# Patient Record
Sex: MALE | Employment: OTHER | URBAN - METROPOLITAN AREA
[De-identification: names, ages, dates, MRNs, and addresses within clinical notes are randomized per-mention and may not be internally consistent; named-entity substitution may affect disease eponyms.]

---

## 2023-05-31 ENCOUNTER — OFFICE VISIT (OUTPATIENT)
Age: 38
End: 2023-05-31
Payer: OTHER GOVERNMENT

## 2023-05-31 VITALS
OXYGEN SATURATION: 97 % | RESPIRATION RATE: 16 BRPM | SYSTOLIC BLOOD PRESSURE: 160 MMHG | BODY MASS INDEX: 35.88 KG/M2 | DIASTOLIC BLOOD PRESSURE: 101 MMHG | WEIGHT: 250.6 LBS | TEMPERATURE: 97.2 F | HEIGHT: 70 IN | HEART RATE: 86 BPM

## 2023-05-31 DIAGNOSIS — F32.A DEPRESSION, UNSPECIFIED DEPRESSION TYPE: ICD-10-CM

## 2023-05-31 DIAGNOSIS — G25.81 RESTLESS LEGS SYNDROME: Primary | ICD-10-CM

## 2023-05-31 DIAGNOSIS — F17.219 CIGARETTE NICOTINE DEPENDENCE WITH NICOTINE-INDUCED DISORDER: ICD-10-CM

## 2023-05-31 DIAGNOSIS — F15.10 CAFFEINE ABUSE (HCC): ICD-10-CM

## 2023-05-31 DIAGNOSIS — R03.0 ELEVATED BLOOD PRESSURE READING: ICD-10-CM

## 2023-05-31 PROCEDURE — 99204 OFFICE O/P NEW MOD 45 MIN: CPT | Performed by: INTERNAL MEDICINE

## 2023-05-31 RX ORDER — ROPINIROLE 1 MG/1
1 TABLET, FILM COATED ORAL DAILY
Qty: 90 TABLET | Refills: 3 | Status: SHIPPED | OUTPATIENT
Start: 2023-05-31

## 2023-05-31 RX ORDER — GABAPENTIN 300 MG/1
600 CAPSULE ORAL
Qty: 90 CAPSULE | Refills: 3 | Status: SHIPPED | OUTPATIENT
Start: 2023-05-31 | End: 2023-08-31

## 2023-05-31 RX ORDER — FLUOXETINE HYDROCHLORIDE 20 MG/1
20 CAPSULE ORAL DAILY
COMMUNITY

## 2023-05-31 ASSESSMENT — SLEEP AND FATIGUE QUESTIONNAIRES
HOW LIKELY ARE YOU TO NOD OFF OR FALL ASLEEP WHILE SITTING QUIETLY AFTER LUNCH WITHOUT ALCOHOL: 0
HOW LIKELY ARE YOU TO NOD OFF OR FALL ASLEEP WHILE SITTING INACTIVE IN A PUBLIC PLACE: 0
ESS TOTAL SCORE: 4
HOW LIKELY ARE YOU TO NOD OFF OR FALL ASLEEP WHEN YOU ARE A PASSENGER IN A CAR FOR AN HOUR WITHOUT A BREAK: 0
HOW LIKELY ARE YOU TO NOD OFF OR FALL ASLEEP WHILE SITTING AND TALKING TO SOMEONE: 0
HOW LIKELY ARE YOU TO NOD OFF OR FALL ASLEEP WHILE LYING DOWN TO REST IN THE AFTERNOON WHEN CIRCUMSTANCES PERMIT: 2
HOW LIKELY ARE YOU TO NOD OFF OR FALL ASLEEP WHILE WATCHING TV: 1
HOW LIKELY ARE YOU TO NOD OFF OR FALL ASLEEP IN A CAR, WHILE STOPPED FOR A FEW MINUTES IN TRAFFIC: 0
HOW LIKELY ARE YOU TO NOD OFF OR FALL ASLEEP WHILE SITTING AND READING: 1
NECK CIRCUMFERENCE (INCHES): 18.5

## 2023-05-31 ASSESSMENT — PATIENT HEALTH QUESTIONNAIRE - PHQ9
SUM OF ALL RESPONSES TO PHQ QUESTIONS 1-9: 0
2. FEELING DOWN, DEPRESSED OR HOPELESS: 0
SUM OF ALL RESPONSES TO PHQ QUESTIONS 1-9: 0
SUM OF ALL RESPONSES TO PHQ9 QUESTIONS 1 & 2: 0
1. LITTLE INTEREST OR PLEASURE IN DOING THINGS: 0

## 2023-05-31 NOTE — PATIENT INSTRUCTIONS
Please try taking Requip at around 1800 and gabapentin about  30 minutes prior to going to sleep      Please call our clinic back at [phone] or send a message on "[employer] [place]" if you have any questions or concerns or if you are experiencing any of the following: You have not received a follow up appointment within 30 days prior the recommended follow up time. If you are not tolerating treatment plan and/or not able to obtain equipment or prescribed medication(s). if you are experiencing any difficulties with the [employer]  (DME) Company you may be using or is assigned to you. Two weeks have passed and you have not received an appointment for a scheduled procedure. Two weeks have passed since you underwent a test and/or procedure and you have not received your results. Patients on Inhaler Therapy: If you are having difficulty and/or are not able to obtain refills of your inhalers- Please contact our office as soon as possible  Please read directions carefully and use inhalers as directed  If you are not sure how to properly use your inhaler please call our office or ask your pharmacist.  Instructional video can also be found on-line as well    If you are using a CPAP/BIPAP, or Home Ventilator Device- Please note the following. Currently, many DMEs are experiencing supply chain difficulties and orders for equipment may be back logged several weeks to months. Your  Durable Medical Equipment (DME ) company is supposed to provide you with replacement filters, tubing and masks. You can either call your DME when you need new supplies or you can arrange for an automatic shipment schedule. Your need to be seen by our office at lat minimum of every 12 months in order to renew the prescription for these supplies. Please make note of who your DME company is and their phone number. Please make sure that you clean your mask and hosing on a regular basis.   Your DME can provide you with
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
## 2023-05-31 NOTE — PROGRESS NOTES
Slava Jaimes presents today for   Chief Complaint   Patient presents with    Other     Restless Legs Syndrome        Is someone accompanying this pt? No    Is the patient using any DME equipment during OV? No    -DME Company NA    Depression Screening:    PHQ-9 Questionaire 5/31/2023   Little interest or pleasure in doing things 0   Feeling down, depressed, or hopeless 0   PHQ-9 Total Score 0       Learning Needs Questionnaire:     No question data found. Fall Risk:     No flowsheet data found. Abuse Screening:     No flowsheet data found. Coordination of Care:    1. Have you been to the ER, urgent care clinic since your last visit? Hospitalized since your last visit? No    2. Have you seen or consulted any other health care providers outside of the [address] [place] [address] since your last visit? Include any pap smears or colon screening. No    Medication list has been update per patient.

## 2023-08-16 ENCOUNTER — OFFICE VISIT (OUTPATIENT)
Age: 38
End: 2023-08-16

## 2023-08-16 VITALS
BODY MASS INDEX: 35.28 KG/M2 | SYSTOLIC BLOOD PRESSURE: 140 MMHG | TEMPERATURE: 98.8 F | RESPIRATION RATE: 20 BRPM | OXYGEN SATURATION: 95 % | HEIGHT: 70 IN | WEIGHT: 246.4 LBS | HEART RATE: 83 BPM | DIASTOLIC BLOOD PRESSURE: 87 MMHG

## 2023-08-16 DIAGNOSIS — F15.10 CAFFEINE ABUSE (HCC): ICD-10-CM

## 2023-08-16 DIAGNOSIS — F17.219 CIGARETTE NICOTINE DEPENDENCE WITH NICOTINE-INDUCED DISORDER: ICD-10-CM

## 2023-08-16 DIAGNOSIS — G25.81 RESTLESS LEGS SYNDROME: Primary | ICD-10-CM

## 2023-08-16 DIAGNOSIS — R03.0 ELEVATED BLOOD PRESSURE READING: ICD-10-CM

## 2023-08-16 PROCEDURE — 99213 OFFICE O/P EST LOW 20 MIN: CPT | Performed by: INTERNAL MEDICINE

## 2023-08-16 RX ORDER — GABAPENTIN 300 MG/1
600 CAPSULE ORAL
Qty: 90 CAPSULE | Refills: 3 | Status: SHIPPED | OUTPATIENT
Start: 2023-08-16 | End: 2024-02-12

## 2023-08-16 RX ORDER — ROPINIROLE 1 MG/1
TABLET, FILM COATED ORAL
Qty: 90 TABLET | Refills: 3 | Status: SHIPPED | OUTPATIENT
Start: 2023-08-16

## 2023-08-16 NOTE — PATIENT INSTRUCTIONS
Please call our clinic back at [phone] or send a message on [employer] if you have any questions or concerns or if you are experiencing any of the following: You have not received a follow up appointment within 30 days prior the recommended follow up time. If you are not tolerating treatment plan and/or not able to obtain equipment or prescribed medication(s). if you are experiencing any difficulties with the [facility]  (DME) Company you may be using or is assigned to you. Two weeks have passed and you have not received an appointment for a scheduled procedure. Two weeks have passed since you underwent a test and/or procedure and you have not received your results. Patients on Inhaler Therapy: If you are having difficulty and/or are not able to obtain refills of your inhalers- Please contact our office as soon as possible  Please read directions carefully and use inhalers as directed  If you are not sure how to properly use your inhaler please call our office or ask your pharmacist.  Instructional video can also be found on-line as well    If you are using a CPAP/BIPAP, or Home Ventilator Device- Please note the following. Currently, many DMEs are experiencing supply chain difficulties and orders for equipment may be back logged several weeks to months. Your  Durable Medical Equipment (DME ) company is supposed to provide you with replacement filters, tubing and masks. You can either call your DME when you need new supplies or you can arrange for an automatic shipment schedule. Your need to be seen by our office at lat minimum of every 12 months in order to renew the prescription for these supplies. Please make note of who your DME company is and their phone number. Please make sure that you clean your mask and hosing on a regular basis.   Your DME can provide you with additional information regarding proper care and cleaning of your device           Safety is
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
## 2023-08-16 NOTE — PROGRESS NOTES
Jolene Lee presents today for   Chief Complaint   Patient presents with    Follow-up     Restless Legs Syndrome       Is someone accompanying this pt? No    Is the patient using any DME equipment during OV? No    -DME Company NA    Depression Screening:    PHQ-9 Questionaire 5/31/2023   Little interest or pleasure in doing things 0   Feeling down, depressed, or hopeless 0   PHQ-9 Total Score 0       Learning Needs Questionnaire:     No question data found. Fall Risk:     No flowsheet data found. Abuse Screening:     No flowsheet data found. Coordination of Care:    1. Have you been to the ER, urgent care clinic since your last visit? Hospitalized since your last visit? No    2. Have you seen or consulted any other health care providers outside of the [address] [place] [address] since your last visit? Include any pap smears or colon screening. PCP    Medication list has been update per patient.

## 2023-11-08 DIAGNOSIS — G25.81 RESTLESS LEGS SYNDROME: ICD-10-CM

## 2023-11-08 RX ORDER — ROPINIROLE 1 MG/1
TABLET, FILM COATED ORAL
Qty: 60 TABLET | Refills: 3 | OUTPATIENT
Start: 2023-11-08

## 2023-11-08 NOTE — TELEPHONE ENCOUNTER
Pt stated that he would like a refill on his medication to be filled at [facility]. For further information please call [phone]

## 2023-11-08 NOTE — TELEPHONE ENCOUNTER
Spoke to pt,  pt should still have refills on medication put in 8/2023 by Dr. Martinez.  Pt to call [facility] and request refill.  Pt understands and will call back if any problems.

## 2024-02-08 ENCOUNTER — TELEPHONE (OUTPATIENT)
Age: 39
End: 2024-02-08

## 2024-02-08 DIAGNOSIS — G25.81 RESTLESS LEGS SYNDROME: ICD-10-CM

## 2024-02-08 RX ORDER — ROPINIROLE 1 MG/1
TABLET, FILM COATED ORAL
Qty: 90 TABLET | Refills: 3 | Status: SHIPPED | OUTPATIENT
Start: 2024-02-08

## 2024-02-08 RX ORDER — ROPINIROLE 1 MG/1
TABLET, FILM COATED ORAL
Qty: 90 TABLET | Refills: 3 | Status: SHIPPED | OUTPATIENT
Start: 2024-02-08 | End: 2024-02-08

## 2024-02-08 NOTE — TELEPHONE ENCOUNTER
Pt requesting refill on Requip to be sent to [facility] pharmacy on file. Previous pt of MP scheduled to see NM 2/28 @ 9:50. Pt contact # [phone]